# Patient Record
Sex: FEMALE | Race: WHITE
[De-identification: names, ages, dates, MRNs, and addresses within clinical notes are randomized per-mention and may not be internally consistent; named-entity substitution may affect disease eponyms.]

---

## 2018-11-28 ENCOUNTER — HOSPITAL ENCOUNTER (OUTPATIENT)
Dept: HOSPITAL 33 - SDC-PAIN | Age: 43
End: 2018-11-28
Attending: PSYCHIATRY & NEUROLOGY
Payer: COMMERCIAL

## 2018-11-28 DIAGNOSIS — M25.511: ICD-10-CM

## 2018-11-28 DIAGNOSIS — M25.512: ICD-10-CM

## 2018-11-28 DIAGNOSIS — M25.561: Primary | ICD-10-CM

## 2018-11-28 DIAGNOSIS — M25.562: ICD-10-CM

## 2018-11-28 PROCEDURE — 73560 X-RAY EXAM OF KNEE 1 OR 2: CPT

## 2018-11-28 PROCEDURE — 73030 X-RAY EXAM OF SHOULDER: CPT

## 2018-11-28 PROCEDURE — 76000 FLUOROSCOPY <1 HR PHYS/QHP: CPT

## 2018-11-28 PROCEDURE — 20610 DRAIN/INJ JOINT/BURSA W/O US: CPT

## 2018-11-28 PROCEDURE — 77002 NEEDLE LOCALIZATION BY XRAY: CPT

## 2018-11-29 NOTE — XRAY
Exam: C-arm images of the left knee for therapeutic injection.



Findings: 8 seconds of intraoperative fluoroscopy time was utilized.  A single

frontal C-arm image of the left knee was obtained.  A spinal needle is seen

projected over the distal left femur near the midline.  Contrast media has

been injected.  Correlate with therapeutic procedure.

## 2018-11-29 NOTE — XRAY
Exam: C-arm images of the left shoulder for therapeutic injection.



Findings: 15 seconds of intraoperative fluoroscopy time was utilized.  2 AP

C-arm images of the left shoulder were obtained.  A spinal needle is seen

directed toward the upper medial margin of the left humeral head.  Some

contrast media has been injected.  Correlate with therapeutic procedure.

## 2018-11-29 NOTE — XRAY
Exam: C-arm images of the right knee for therapeutic injection.



Findings: 5 seconds of intraoperative fluoroscopy time was utilized.  A single

frontal C-arm image of the right knee was obtained.  A needle tip is seen

projected over the distal right femur near the midline.  Contrast media has

been injected.  Correlate with therapeutic procedure.

## 2018-11-29 NOTE — XRAY
Exam: C-arm images of the right shoulder for therapeutic injection.



Findings: 11 seconds of intraoperative fluoroscopy time was utilized.  2 AP

intraoperative C-arm images reveal a spinal needle directed toward the

superior medial margin of the right humeral head.  Some contrast media has

been injected.  Correlate with therapeutic procedure.